# Patient Record
Sex: FEMALE | Race: WHITE
[De-identification: names, ages, dates, MRNs, and addresses within clinical notes are randomized per-mention and may not be internally consistent; named-entity substitution may affect disease eponyms.]

---

## 2018-03-13 ENCOUNTER — HOSPITAL ENCOUNTER (OUTPATIENT)
Dept: HOSPITAL 95 - LAB EV | Age: 33
Discharge: HOME | End: 2018-03-13
Payer: COMMERCIAL

## 2018-03-13 DIAGNOSIS — L02.419: Primary | ICD-10-CM

## 2020-09-14 NOTE — NUR
DISCHARGE SUMMARY
PT D/C HOME TODAY AT 2055 VIA WHEELCHAIR TO PRIVATE VEHICLE. IV REMOVED.
DISCHARGE INSTRUCTIONS, SCRIPTS, AND PERSONAL BELONGINGS PROVIDED TO PT PRIOR
TO DISCHARGE. PT VERBALIZED UNDERSTANDING OF D/C INSTRUCTIONS AND DECLINED ANY
CONCERNS. ABLE TO AMB IND, DENIES PAIN, AND REPORTS HARIS PO INTAKE. ALSO
REPORTS VOIDING WITHOUT DIFFICULTY. PT ENCOURAGED TO F/U WITH PROVIDER AS
INSTRUCTED AND TO ADHERE TO D/C PRECAUTIONS.

## 2020-09-14 NOTE — NUR
PT ARRIVED TO THE ROOM AT APPROXIMATELY 1240.  SHE IS ALERT AND ORIENTED.  SHE
COMPLAINS OF MILD ABD DISCOMFORT.  TYLENOL AND GAS X GIVEN FOR COMFORT.  PT
DENIES NAUSEA.  JANE CATHETER IS IN PLACE, PT COMPLAINS THAT SHE FEELS LIKE
SHE NEEDS TO VOID WITH CATHETER IN PLACE.  ABD LAP SITES HAVE STERI-STRIPS IN
TACT.  WILL CONTINUE TO MONITOR.

## 2020-09-14 NOTE — NUR
SHIFT SUMMARY
PAIN HAS BEEN MANAGED WITH TYLENOL AND TORADOL THIS SHIFT.  PT HAS AMBULATED
AND IS TOLERATING PO.  PT'S CATHETER WAS REMOVED AFTER SHE AMBULATED.  SHE HAS
BEEN ABLE TO VOID AND PASS FLATUS.  PT HAS HAD SCANT VAGINAL BLEEDING.  PT HAS
SOME NAUSEA AFTER SHE ATE BUT IS SINCE FEELING BETTER AFTER PASSING FLATUS.
VSS.
 
WILL MONITOR UNTIL REPORT TO ONCOMING RN.

## 2020-09-14 NOTE — NUR
Ambulatory in Day Surgery
History, Chart, Medications and Allergies reviewed before start of
procedure.Patient confirms NPO status and agrees with scheduled surgery.
Patient reports completing Chlorhexadine shower X2 prior to admission to
hospital.Surgical site prepped with 2% Chlorhexidine cloth wipe.